# Patient Record
Sex: FEMALE | Race: WHITE | NOT HISPANIC OR LATINO | URBAN - NONMETROPOLITAN AREA
[De-identification: names, ages, dates, MRNs, and addresses within clinical notes are randomized per-mention and may not be internally consistent; named-entity substitution may affect disease eponyms.]

---

## 2018-06-08 ENCOUNTER — OFFICE VISIT CONVERTED (OUTPATIENT)
Dept: CARDIOLOGY | Facility: CLINIC | Age: 23
End: 2018-06-08
Attending: INTERNAL MEDICINE

## 2018-06-15 ENCOUNTER — OFFICE VISIT CONVERTED (OUTPATIENT)
Dept: CARDIOLOGY | Facility: CLINIC | Age: 23
End: 2018-06-15
Attending: INTERNAL MEDICINE

## 2018-06-15 ENCOUNTER — CONVERSION ENCOUNTER (OUTPATIENT)
Dept: OTHER | Facility: HOSPITAL | Age: 23
End: 2018-06-15

## 2018-10-30 ENCOUNTER — OFFICE VISIT CONVERTED (OUTPATIENT)
Dept: GASTROENTEROLOGY | Facility: CLINIC | Age: 23
End: 2018-10-30
Attending: NURSE PRACTITIONER

## 2018-10-30 ENCOUNTER — CONVERSION ENCOUNTER (OUTPATIENT)
Dept: GASTROENTEROLOGY | Facility: CLINIC | Age: 23
End: 2018-10-30

## 2019-01-18 ENCOUNTER — HOSPITAL ENCOUNTER (OUTPATIENT)
Dept: URGENT CARE | Facility: CLINIC | Age: 24
Discharge: HOME | End: 2019-01-18

## 2019-01-20 LAB — BACTERIA SPEC AEROBE CULT: NORMAL

## 2019-01-27 ENCOUNTER — HOSPITAL ENCOUNTER (OUTPATIENT)
Dept: URGENT CARE | Facility: CLINIC | Age: 24
Discharge: HOME | End: 2019-01-27

## 2019-01-29 LAB — BACTERIA SPEC AEROBE CULT: NORMAL

## 2019-02-14 ENCOUNTER — HOSPITAL ENCOUNTER (OUTPATIENT)
Dept: URGENT CARE | Facility: CLINIC | Age: 24
Discharge: HOME | End: 2019-02-14
Attending: NURSE PRACTITIONER

## 2019-02-18 ENCOUNTER — HOSPITAL ENCOUNTER (OUTPATIENT)
Dept: URGENT CARE | Facility: CLINIC | Age: 24
Discharge: HOME | End: 2019-02-18
Attending: NURSE PRACTITIONER

## 2019-03-20 ENCOUNTER — HOSPITAL ENCOUNTER (OUTPATIENT)
Dept: URGENT CARE | Facility: CLINIC | Age: 24
Discharge: HOME | End: 2019-03-20

## 2019-03-22 LAB — BACTERIA SPEC AEROBE CULT: NORMAL

## 2019-05-13 ENCOUNTER — HOSPITAL ENCOUNTER (OUTPATIENT)
Dept: URGENT CARE | Facility: CLINIC | Age: 24
Discharge: HOME | End: 2019-05-13
Attending: PHYSICIAN ASSISTANT

## 2019-06-10 ENCOUNTER — HOSPITAL ENCOUNTER (OUTPATIENT)
Dept: URGENT CARE | Facility: CLINIC | Age: 24
Discharge: HOME | End: 2019-06-10
Attending: PHYSICIAN ASSISTANT

## 2019-07-11 ENCOUNTER — HOSPITAL ENCOUNTER (OUTPATIENT)
Dept: URGENT CARE | Facility: CLINIC | Age: 24
Discharge: HOME | End: 2019-07-11
Attending: NURSE PRACTITIONER

## 2019-07-14 LAB
AMOXICILLIN+CLAV SUSC ISLT: >=32
AMPICILLIN SUSC ISLT: >=32
AMPICILLIN+SULBAC SUSC ISLT: >=32
BACTERIA UR CULT: ABNORMAL
CEFAZOLIN SUSC ISLT: 8
CEFEPIME SUSC ISLT: <=1
CEFTAZIDIME SUSC ISLT: <=1
CEFTRIAXONE SUSC ISLT: <=1
CEFUROXIME ORAL SUSC ISLT: 4
CEFUROXIME PARENTER SUSC ISLT: 4
CIPROFLOXACIN SUSC ISLT: <=0.25
ERTAPENEM SUSC ISLT: <=0.5
GENTAMICIN SUSC ISLT: <=1
LEVOFLOXACIN SUSC ISLT: <=0.12
NITROFURANTOIN SUSC ISLT: <=16
TETRACYCLINE SUSC ISLT: <=1
TMP SMX SUSC ISLT: >=320
TOBRAMYCIN SUSC ISLT: <=1

## 2019-08-06 ENCOUNTER — HOSPITAL ENCOUNTER (OUTPATIENT)
Dept: URGENT CARE | Facility: CLINIC | Age: 24
Discharge: HOME | End: 2019-08-06

## 2019-08-12 ENCOUNTER — HOSPITAL ENCOUNTER (OUTPATIENT)
Dept: URGENT CARE | Facility: CLINIC | Age: 24
Discharge: HOME | End: 2019-08-12
Attending: PHYSICIAN ASSISTANT

## 2019-10-07 ENCOUNTER — HOSPITAL ENCOUNTER (OUTPATIENT)
Dept: URGENT CARE | Facility: CLINIC | Age: 24
Discharge: HOME | End: 2019-10-07
Attending: FAMILY MEDICINE

## 2019-11-05 ENCOUNTER — HOSPITAL ENCOUNTER (OUTPATIENT)
Dept: URGENT CARE | Facility: CLINIC | Age: 24
Discharge: HOME | End: 2019-11-05
Attending: PHYSICIAN ASSISTANT

## 2019-11-07 ENCOUNTER — HOSPITAL ENCOUNTER (OUTPATIENT)
Dept: URGENT CARE | Facility: CLINIC | Age: 24
Discharge: HOME | End: 2019-11-07
Attending: PHYSICIAN ASSISTANT

## 2019-11-09 LAB — BACTERIA SPEC AEROBE CULT: NORMAL

## 2020-03-26 ENCOUNTER — HOSPITAL ENCOUNTER (OUTPATIENT)
Dept: URGENT CARE | Facility: CLINIC | Age: 25
Discharge: HOME | End: 2020-03-26
Attending: EMERGENCY MEDICINE

## 2020-06-02 ENCOUNTER — OFFICE VISIT CONVERTED (OUTPATIENT)
Dept: UROLOGY | Facility: CLINIC | Age: 25
End: 2020-06-02
Attending: NURSE PRACTITIONER

## 2020-08-03 ENCOUNTER — OFFICE VISIT CONVERTED (OUTPATIENT)
Dept: UROLOGY | Facility: CLINIC | Age: 25
End: 2020-08-03
Attending: NURSE PRACTITIONER

## 2020-08-09 ENCOUNTER — HOSPITAL ENCOUNTER (OUTPATIENT)
Dept: URGENT CARE | Facility: CLINIC | Age: 25
Discharge: HOME | End: 2020-08-09

## 2020-08-30 ENCOUNTER — HOSPITAL ENCOUNTER (OUTPATIENT)
Dept: URGENT CARE | Facility: CLINIC | Age: 25
Discharge: HOME | End: 2020-08-30

## 2020-08-30 LAB — GLUCOSE BLD-MCNC: 150 MG/DL (ref 65–99)

## 2020-09-02 LAB — BACTERIA UR CULT: NORMAL

## 2020-10-02 ENCOUNTER — HOSPITAL ENCOUNTER (OUTPATIENT)
Dept: URGENT CARE | Facility: CLINIC | Age: 25
Discharge: HOME | End: 2020-10-02
Attending: FAMILY MEDICINE

## 2020-10-23 ENCOUNTER — OFFICE VISIT CONVERTED (OUTPATIENT)
Dept: OTOLARYNGOLOGY | Facility: CLINIC | Age: 25
End: 2020-10-23
Attending: OTOLARYNGOLOGY

## 2020-10-23 ENCOUNTER — CONVERSION ENCOUNTER (OUTPATIENT)
Dept: OTOLARYNGOLOGY | Facility: CLINIC | Age: 25
End: 2020-10-23

## 2021-05-10 NOTE — H&P
"   History and Physical      Patient Name: Cindy Aleman   Patient ID: [id number]   Sex: Female   YOB: 1995    Primary Care Provider: Loretta GASCA   Referring Provider: Loretta GASCA    Visit Date: June 2, 2020    Provider: ROHIT Mata   Location: [facility]   Location Address: [address] [place] [address], [address] [place] [address]  [id number]   Location Phone: [phone]          Chief Complaint  · Leaking urine      History Of Present Illness  The patient is a 25 year old /White female , who presents for evaluation for urinary incontinence and possible uti, referred by ROHIT Corrales. Office note dated 4/30/2020 plan indicated order for oxybutynin 5 mg bid and kegels in reference to urological concerns. No urinalysis or urine cultures performed. Birth control Nexplanon implanted 2018. Patient complains of urgency and urge incontinence. She also has occasional nikita with coughing and sneezing. Reports this all began a few months ago. She was diagnosed with diabetes in April and diagnosed with diabetes and started on metformin. Prior to that, she states I was told \"prediabetic\" Patient states that she is taking the oxybutynin but doesn't believe it has helped. Patient is up all night and sleeps during the day. Gained 35 pounds in the past year. Obtained records from [facility] visit. Patient had CT 2- wnl. Abdominal ultrasound 2017 wnl. She had vcug 7/12/02 which was wnl no evidence of ureteral reflux or diverticulum.       Past Medical History  Anxiety; Carpal tunnel syndrome; Depression; Diabetes mellitus type 2, controlled; Gastric Ulcer; Hypertension; Incontinence; Vitamin D Deficiency         Past Surgical History  Colonoscopy; EGD         Medication List  lisinopril 10 mg oral tablet; loratadine 10 mg oral tablet; metformin 500 mg oral tablet; Nexplanon 68 mg subdermal implant; omeprazole 20 mg oral capsule,delayed release(DR/EC); oxybutynin chloride " "5 mg oral tablet         Allergy List  NyQuil; SULFA (SULFONAMIDES)         Family Medical History  - No Family History of Colorectal Cancer; Ovarian Cancer, Family History         Social History  Alcohol (Light); Tobacco (Former)         Review of Systems  · Constitutional  o Denies  o : fever, headache, chills  · Eyes  o Denies  o : eye pain, double vision, blurred vision  · HENT  o Denies  o : sinus problems, sore throat, ear infection  · Cardiovascular  o Denies  o : chest pain, high blood pressure, varicosities  · Respiratory  o Denies  o : shortness of breath, wheezing, frequent cough  · Gastrointestinal  o Denies  o : nausea, vomiting, heartburn, indigestion, abdominal pain  · Genitourinary  o Admits  o : incontinence  o Denies  o : urgency, frequency, urinary retention, painful urination  · Integument  o Denies  o : rash, itching, boils  · Neurologic  o Denies  o : tingling or numbness, tremors, dizzy spells  · Musculoskeletal  o Denies  o : joint pain, neck pain, back pain  · Endocrine  o Denies  o : cold intolerance, heat intolerance, tired, excessive thirst, sluggish  · Psychiatric  o Admits  o : feels satisfied with life  o Denies  o : severe depression, concerns with hurting themselves  · Heme-Lymph  o Denies  o : swollen glands, blood clotting problems  · Allergic-Immunologic  o Denies  o : sinus allergy symptoms, hay fever      Vitals  Date Time BP Position Site L\R Cuff Size HR RR TEMP (F) WT  HT  BMI kg/m2 BSA m2 O2 Sat        06/02/2020 02:44 /93 Sitting    123 - R  98.3 208lbs 0oz 4'  9\" 45.01 1.95           Physical Examination  · Constitutional  o Appearance  o : Well nourished, well developed patient in no acute distress. Ambulating without difficulty.  · Respiratory  o Respiratory Effort  o : Breathing is unlabored without accessory muscle use  o Inspection of Chest  o : normal appearance, no retractions  o Auscultation of Lungs  o : Normal breath sounds  · Cardiovascular  o Heart  o : "   § Auscultation of Heart  § : irregular rate and rhythm, no murmurs, gallops or rubs  o Peripheral Vascular System  o : No abnormalities  · Gastrointestinal  o Abdominal Examination  o : Scaphoid abdomen which is non-tender to palpation with normal tone and without rigidity or guarding. Normal bowel sounds. No masses present.  o Liver and spleen  o : No hepatomegaly present. Liver is non-tender to palpation and spleen is not palpable.  o Hernias  o : No abdominal wall hernias are present.  · Genitourinary  o External Genitalia  o : no abnormalities  o Vagina  o : no abnormalities bladder neck hypermobile with cough and very poor vaginal muscle tone. mild cysto urethrocele white discharge   o Urethra  o : no abnormalities  o Bladder  o : Non-tender to palpation without distension.  o Cervix  o : no lesions present, nontender to palpation  o Uterus  o : nontender to palpation  o Adnexa  o : No adnexal tenderness present, no adnexal masses present, ovaries not palpable  · Lymphatic  o Groin  o : No lymphadenopathy present  · Skin and Subcutaneous Tissue  o General Inspection  o : No rashes, lesions or areas of discoloration present. Skin turgor is normal.  o General Palpation  o : No abnormalities, masses or tenderness on palpation.  · Neurologic  o Mental Status Examination  o : grossly oriented to person, place and time  o Gait and Station  o : normal gait, able to stand without difficulty  · Psychiatric  o Mood and Affect  o : mood normal, affect appropriate      Figure 1.0: Pain Rating Scale-[place]         Results  · In-Office Procedures  o Lab procedure  § Automated dipstick urinalysis with microscopy ([id number])   § Color Ur: Lt. Yellow   § Clarity Ur: Other   § Glucose Ur Ql Strip: Negative   § Bilirub Ur Ql Strip: Negative   § Ketones Ur Ql Strip: Negative   § Sp Gr Ur Qn: greater than 1.030   § Hgb Ur Ql Strip: Trace-Intact   § pH Ur-LsCnc: 7.5   § Prot Ur Ql Strip: Negative   § Urobilinogen Ur Strip-mCnc: 0.2  E.U./dL   § Nitrite Ur Ql Strip: Negative   § WBC Est Ur Ql Strip: Negative   § RBC UrnS Qn HPF: 0   § WBC UrnS Qn HPF: 0   § Bacteria UrnS Qn HPF: 0   § Crystals UrnS Qn HPF: large amorphous sediment   § Epithelial Cells (non renal): vaginal cells /HPF  § Epithelial Cells (renal): 0       Assessment  · Urinary Incontinence     788.30/R32  · Hypertension     401.9/I10  · Frequency-urgency syndrome     596.51/N31.8  · Diabetes     250.00/E11.9  · Obesity     278.00/E66.9  bmi recorded as 42.0  · History of UTI     V13.02/Z87.440  · Candida infection     112.9/B37.9    Problems Reconciled  Plan  · Medications  o Diflucan 200 mg oral tablet   SIG: take 1 tablet (200 mg) by oral route once daily for 3 days   DISP: (3) tablets with 0 refills  Prescribed on 06/02/2020     o Medications have been Reconciled  o Transition of Care or Provider Policy     will schedule urodynamics then plan for possible cysto.  patient has very weak and poor vaginal tone and explained that she needs to perform kegels and strengthen pelvic floor. she may also have a small bladder capcity.  patient is significantly overweight and educated on obesity and pressure on pelvic floor. She also had problems with htn, diabetes which is also likely related to her weight issues. she may continue oxybutynin bid per pcp. ct previous normal no stones or upper tract problems. vcug 2002 normal no reflux. rx Diflucan for vaginal yeast. recommend possible dietician consult and possible sleep eval. follow up with ob/gyn for her birth control.             Electronically Signed by: ROHIT Mata -Author on June 2, 2020 04:49:12 PM

## 2021-05-10 NOTE — H&P
"   History and Physical      Patient Name: Cindy Aleman   Patient ID: [id number]   Sex: Female   YOB: 1995    Primary Care Provider: Mraquita Parson   Referring Provider: Percy Guillaume MD    Visit Date: October 23, 2020    Provider: John Lim MD   Location: [facility]   Location Address: [address] [place] [address], [address] [place] [address]  [id number]   Location Phone: [phone]          Chief Complaint     \"I get swimmer's ear like crazy.\"       History Of Present Illness  Cindy Aleman is a 25 year old /White female with past medical history significant for type II diabetes mellitus and bruxism who presents to the office today as a consult from Percy Guillaume MD for evaluation of her ears. She tells me that ever since she was diagnosed with type 2 diabetes mellitus she has \"had swimmer's ear like crazy.\" It only seems to affect 1 year at a time and has alternated sides. Her most recent episode occurred at the beginning of October and involved her left ear. She was treated with an unknown ototopical antibiotic drops but tells me that it took approximately 3 weeks for her to feel better. Her typical episode involves severe ear pain, facial swelling, trismus, and muffled hearing. She denies any otorrhea. She has not experienced any tinnitus, vertigo, prior otologic trauma, or prior otologic surgery. She does not use any Q-tips. She does report a significant history of bruxism. She estimates that she has been treated for at least 3 episodes over the last year.       Past Medical History  Anxiety; Carpal tunnel syndrome; Depression; Diabetes mellitus type 2, controlled; Gastric Ulcer; Hypertension; Incontinence; Otitis externa of left ear; Vitamin D Deficiency         Past Surgical History  Colonoscopy; EGD; [place] Tooth Extraction         Medication List  Bydureon 2 mg/0.65 mL subcutaneous pen injector; metformin 500 mg oral tablet; omeprazole 20 mg oral capsule,delayed " "release(DR/EC); oxybutynin chloride 5 mg oral tablet         Allergy List  NyQuil; SULFA (SULFONAMIDES)         Family Medical History  Family history of lung cancer; Family history of ovarian cancer         Social History  Alcohol (Light); Tobacco (Never)         Review of Systems  · Constitutional  o Denies  o : fever, night sweats, weight loss  · Eyes  o Denies  o : discharge from eye, impaired vision  · HENT  o Admits  o : *See HPI  · Cardiovascular  o Admits  o : irregular heart beats  o Denies  o : chest pain  · Respiratory  o Admits  o : shortness of breath, wheezing  o Denies  o : coughing up blood  · Gastrointestinal  o Denies  o : heartburn, reflux, vomiting blood  · Genitourinary  o Admits  o : frequency  · Integument  o Denies  o : rash, skin dryness  · Neurologic  o Denies  o : seizures, loss of balance, loss of consciousness, dizziness  · Endocrine  o Denies  o : cold intolerance, heat intolerance  · Heme-Lymph  o Denies  o : easy bleeding, anemia      Vitals  Date Time BP Position Site L\R Cuff Size HR RR TEMP (F) WT  HT  BMI kg/m2 BSA m2 O2 Sat FR L/min FiO2        10/23/2020 10:08 AM        98 212lbs 16oz 4'  9\" 46.09 1.97             Physical Examination  · Constitutional  o Appearance  o : well developed, well-nourished, alert and in no acute distress, voice clear and strong  · Head and Face  o Head  o :   § Inspection  § : no deformities or lesions  o Face  o :   § Inspection  § : No facial lesions; House-Brackmann I/VI bilaterally  § Palpation  § : TMJ crepitus with temporalis muscle tenderness to palpation bilaterally  · Eyes  o Vision  o :   § Visual Fields  § : Extraocular movements are intact. No spontaneous or gaze-induced nystagmus.  o Conjunctivae  o : clear  o Sclerae  o : clear  o Pupils and Irises  o : pupils equal, round, and reactive to light.   · Ears, Nose, Mouth and Throat  o Ears  o :   § External Ears  § : appearance within normal limits, no lesions present  § Otoscopic " Examination  § : Bilateral external auditory canals are devoid of cerumen. tympanic membrane appearance within normal limits bilaterally without perforations, well-aerated middle ears  § Hearing  § : intact to conversational voice both ears  o Nose  o :   § External Nose  § : appearance normal  § Intranasal Exam  § : mucosa within normal limits, vestibules normal, no intranasal lesions present, septum midline, sinuses non tender to percussion  o Oral Cavity  o :   § Oral Mucosa  § : oral mucosa normal without pallor or cyanosis  § Lips  § : lip appearance normal  § Teeth  § : normal dentition for age  § Gums  § : gums pink, non-swollen, no bleeding present  § Tongue  § : tongue appearance normal; normal mobility  § Palate  § : hard palate normal, soft palate appearance normal with symmetric mobility  o Throat  o :   § Oropharynx  § : no inflammation or lesions present, tonsils within normal limits  § Hypopharynx  § : Deferred secondary to gag reflex  § Larynx  § : Deferred secondary to gag reflex  · Neck  o Inspection/Palpation  o : normal appearance, no masses or tenderness, trachea midline; thyroid size normal, nontender, no nodules or masses present on palpation  · Respiratory  o Respiratory Effort  o : breathing unlabored  o Inspection of Chest  o : normal appearance, no retractions  · Cardiovascular  o Heart  o : regular rate and rhythm  · Lymphatic  o Neck  o : no lymphadenopathy present  o Supraclavicular Nodes  o : no lymphadenopathy present  o Preauricular Nodes  o : no lymphadenopathy present  · Skin and Subcutaneous Tissue  o General Inspection  o : Regarding face and neck - there are no rashes present, no lesions present, and no areas of discoloration  · Neurologic  o Cranial Nerves  o : cranial nerves II-XII are grossly intact bilaterally  o Gait and Station  o : normal gait, able to stand without diffculty  · Psychiatric  o Judgement and Insight  o : judgment and insight intact  o Mood and Affect  o :  mood normal, affect appropriate              Assessment  · Otalgia, bilateral     388.70/H92.03      Plan  · Medications  o Medications have been Reconciled  o Transition of Care or Provider Policy  · Instructions  o Impressions and findings were discussed with Ms. Aleman at great length. Currently, she is seen for evaluation of multiple episodes of otalgia, trismus, facial swelling, and muffled hearing which often resolves with ototopical antibiotics after a few weeks. Examination today reveals her external auditory canals to be devoid of cerumen but otherwise unremarkable. We discussed the differential including recurrent otitis externa versus temporomandibular joint disorder. Options for further evaluation and management were discussed and I have recommended strict water precautions and using a one-to-one mixture of white vinegar and rubbing alcohol in her years daily after getting out of the shower. She reports that she is moving to [facility] in the next few days but may follow-up with me when symptomatic if this occurs before her move.            Electronically Signed by: John Lim MD -Author on October 23, 2020 11:19:23 AM

## 2021-05-13 NOTE — PROGRESS NOTES
"   Progress Note      Patient Name: Cindy Aleman   Patient ID: [id number]   Sex: Female   YOB: 1995    Primary Care Provider: Loretta GASCA   Referring Provider: Loretta GASCA    Visit Date: August 3, 2020    Provider: ROHIT Mata   Location: [facility]   Location Address: [address] [place] [address], [address]  [id number]   Location Phone: [phone]          Chief Complaint  · follow up       History Of Present Illness  The patient is a 25 year old /White female , who is here to follow up for results of urodynamics done 7/30/20. Patient exhibited uninhibited detrusor contractions. Mild nikita. She is taking the oxybutynin 5mg bid and has helped \"some\".         Past Medical History  Anxiety; Carpal tunnel syndrome; Depression; Diabetes mellitus type 2, controlled; Gastric Ulcer; Hypertension; Incontinence; Vitamin D Deficiency         Past Surgical History  Colonoscopy; EGD         Medication List  Bydureon 2 mg/0.65 mL subcutaneous pen injector; Diflucan 200 mg oral tablet; lisinopril 10 mg oral tablet; loratadine 10 mg oral tablet; metformin 500 mg oral tablet; Nexplanon 68 mg subdermal implant; omeprazole 20 mg oral capsule,delayed release(DR/EC); oxybutynin chloride 5 mg oral tablet         Allergy List  NyQuil; SULFA (SULFONAMIDES)       Allergies Reconciled  Family Medical History  - No Family History of Colorectal Cancer; Ovarian Cancer, Family History         Social History  Alcohol (Light); Tobacco (Former)         Review of Systems  · Constitutional  o Denies  o : fever, headache, chills  · Eyes  o Denies  o : eye pain, double vision, blurred vision  · HENT  o Denies  o : sinus problems, sore throat, ear infection  · Cardiovascular  o Denies  o : chest pain, high blood pressure, varicosities  · Respiratory  o Denies  o : shortness of breath, wheezing, frequent cough  · Gastrointestinal  o Denies  o : nausea, vomiting, heartburn, indigestion, " "abdominal pain  · Genitourinary  o Admits  o : frequency, incontinence  o Denies  o : urinary retention, painful urination  · Integument  o Denies  o : rash, itching, boils  · Neurologic  o Denies  o : tingling or numbness, tremors, dizzy spells  · Musculoskeletal  o Denies  o : joint pain, neck pain, back pain  · Endocrine  o Denies  o : cold intolerance, heat intolerance, tired, excessive thirst, sluggish  · Psychiatric  o Admits  o : feels satisfied with life  o Denies  o : severe depression, concerns with hurting themselves  · Heme-Lymph  o Denies  o : swollen glands, blood clotting problems  · Allergic-Immunologic  o Denies  o : sinus allergy symptoms, hay fever      Vitals  Date Time BP Position Site L\R Cuff Size HR RR TEMP (F) WT  HT  BMI kg/m2 BSA m2 O2 Sat HC       08/03/2020 12:56 /85 Sitting    122 - R  98.7 208lbs 0oz 4'  9\" 45.01 1.95           Physical Examination  · Constitutional  o Appearance  o : Well nourished, well developed patient in no acute distress. Ambulating without difficulty.  · Respiratory  o Respiratory Effort  o : Breathing is unlabored without accessory muscle use  o Inspection of Chest  o : normal appearance, no retractions  o Auscultation of Lungs  o : normal breath sounds throughout  · Cardiovascular  o Heart  o :   § Auscultation of Heart  § : regular rate and rhythm, no murmurs, gallops or rubs  o Peripheral Vascular System  o : No abnormalities  · Skin and Subcutaneous Tissue  o General Inspection  o : No rashes, lesions or areas of discoloration present. Skin turgor is normal.  · Neurologic  o Mental Status Examination  o : grossly oriented to person, place and time  o Gait and Station  o : normal gait, able to stand without difficulty  · Psychiatric  o Mood and Affect  o : mood normal, affect appropriate      Figure 1.0: Pain Rating Scale-[place]         Results  · In-Office Procedures  o Lab procedure  § Automated dipstick urinalysis with microscopy ([id number])   § Color " Ur: Yellow   § Clarity Ur: Clear   § Glucose Ur Ql Strip: Negative   § Bilirub Ur Ql Strip: Negative   § Ketones Ur Ql Strip: Negative   § Sp Gr Ur Qn: 1.025   § Hgb Ur Ql Strip: Trace-Intact   § pH Ur-LsCnc: 6.5   § Prot Ur Ql Strip: Negative   § Urobilinogen Ur Strip-mCnc: 0.2 E.U./dL   § Nitrite Ur Ql Strip: Negative   § WBC Est Ur Ql Strip: Negative   § RBC UrnS Qn HPF: 0   § WBC UrnS Qn HPF: 0   § Bacteria UrnS Qn HPF: 0   § Crystals UrnS Qn HPF: 0   § Epithelial Cells (non renal): 0 /HPF  § Epithelial Cells (renal): 0       Assessment  · OAB (overactive bladder)     596.51/N32.81  · IRAJ (stress urinary incontinence, female)     625.6/N39.3  · Obesity     278.00/E66.9    Problems Reconciled  Plan  · Medications  o oxybutynin chloride 15 mg oral tablet extended release 24hr   SIG: take 1 tablet (15 mg) by oral route once daily for 30 days   DISP: (30) tablets with 1 refills  Prescribed on 08/03/2020     o Medications have been Reconciled  o Transition of Care or Provider Policy     discussed results of urodynamics change to oxybutynin xl 15mg daily and to start bladder diary after 1 week on new dose. follow up 3 weeks to see if improved and plan to schedule cysto. patient has mild iraj but only 25 and no children, explained she needs to work on pelvic kegel exercises as discussed.             Electronically Signed by: ROHIT Mata -Author on August 3, 2020 02:12:52 PM

## 2021-05-14 VITALS — TEMPERATURE: 98 F | HEIGHT: 57 IN | WEIGHT: 213 LBS | BODY MASS INDEX: 45.95 KG/M2

## 2021-05-15 VITALS
HEIGHT: 57 IN | TEMPERATURE: 98.7 F | DIASTOLIC BLOOD PRESSURE: 85 MMHG | HEART RATE: 122 BPM | SYSTOLIC BLOOD PRESSURE: 136 MMHG | BODY MASS INDEX: 44.88 KG/M2 | WEIGHT: 208 LBS

## 2021-05-15 VITALS
TEMPERATURE: 98.3 F | HEART RATE: 123 BPM | WEIGHT: 208 LBS | BODY MASS INDEX: 44.88 KG/M2 | HEIGHT: 57 IN | DIASTOLIC BLOOD PRESSURE: 93 MMHG | SYSTOLIC BLOOD PRESSURE: 145 MMHG

## 2021-05-16 VITALS
BODY MASS INDEX: 42.5 KG/M2 | SYSTOLIC BLOOD PRESSURE: 134 MMHG | WEIGHT: 197 LBS | HEIGHT: 57 IN | HEART RATE: 103 BPM | DIASTOLIC BLOOD PRESSURE: 100 MMHG

## 2021-05-16 VITALS
TEMPERATURE: 98.2 F | BODY MASS INDEX: 39.25 KG/M2 | DIASTOLIC BLOOD PRESSURE: 78 MMHG | HEART RATE: 100 BPM | WEIGHT: 187 LBS | SYSTOLIC BLOOD PRESSURE: 126 MMHG | HEIGHT: 58 IN

## 2021-05-16 VITALS
HEIGHT: 57 IN | SYSTOLIC BLOOD PRESSURE: 138 MMHG | WEIGHT: 198 LBS | BODY MASS INDEX: 42.72 KG/M2 | DIASTOLIC BLOOD PRESSURE: 90 MMHG | HEART RATE: 118 BPM